# Patient Record
Sex: FEMALE | URBAN - METROPOLITAN AREA
[De-identification: names, ages, dates, MRNs, and addresses within clinical notes are randomized per-mention and may not be internally consistent; named-entity substitution may affect disease eponyms.]

---

## 2019-02-04 ENCOUNTER — TELEPHONE (OUTPATIENT)
Dept: OTHER | Facility: CLINIC | Age: 29
End: 2019-02-04

## 2019-02-04 NOTE — TELEPHONE ENCOUNTER
2/4/2019    Call Regarding Onboarding [facility] OTHER     Attempt 1    Message on voicemail     Comments: NO DEP       Outreach   [id number]

## 2019-02-12 NOTE — TELEPHONE ENCOUNTER
2/12/2019    Call Regarding Onboarding [employer] OTHER    Attempt 2    Message on voicemail     Comments: NO DEPS          Outreach   AT

## 2019-02-15 NOTE — TELEPHONE ENCOUNTER
2/15/2019    Call Regarding Onboarding: [facility] Other    Attempt 3    Message on voicemail     Comments: No Dep      Outreach   [email or web address]